# Patient Record
Sex: MALE | Race: WHITE | URBAN - METROPOLITAN AREA
[De-identification: names, ages, dates, MRNs, and addresses within clinical notes are randomized per-mention and may not be internally consistent; named-entity substitution may affect disease eponyms.]

---

## 2017-08-05 ENCOUNTER — APPOINTMENT (OUTPATIENT)
Dept: CT IMAGING | Facility: CLINIC | Age: 67
End: 2017-08-05
Attending: EMERGENCY MEDICINE
Payer: COMMERCIAL

## 2017-08-05 ENCOUNTER — HOSPITAL ENCOUNTER (EMERGENCY)
Facility: CLINIC | Age: 67
Discharge: HOME | End: 2017-08-05
Attending: EMERGENCY MEDICINE | Admitting: EMERGENCY MEDICINE
Payer: COMMERCIAL

## 2017-08-05 VITALS
DIASTOLIC BLOOD PRESSURE: 81 MMHG | TEMPERATURE: 97.5 F | RESPIRATION RATE: 18 BRPM | SYSTOLIC BLOOD PRESSURE: 131 MMHG | OXYGEN SATURATION: 98 % | HEART RATE: 75 BPM | WEIGHT: 253 LBS

## 2017-08-05 DIAGNOSIS — R10.9 LEFT FLANK PAIN: ICD-10-CM

## 2017-08-05 LAB
ALBUMIN UR-MCNC: NEGATIVE MG/DL
ANION GAP SERPL CALCULATED.3IONS-SCNC: 6 MMOL/L (ref 3–14)
APPEARANCE UR: ABNORMAL
BACTERIA #/AREA URNS HPF: ABNORMAL /HPF
BASOPHILS # BLD AUTO: 0.1 10E9/L (ref 0–0.2)
BASOPHILS NFR BLD AUTO: 0.9 %
BILIRUB UR QL STRIP: NEGATIVE
BUN SERPL-MCNC: 20 MG/DL (ref 7–30)
CALCIUM SERPL-MCNC: 9.2 MG/DL (ref 8.5–10.1)
CHLORIDE SERPL-SCNC: 107 MMOL/L (ref 94–109)
CO2 SERPL-SCNC: 28 MMOL/L (ref 20–32)
COLOR UR AUTO: YELLOW
CREAT SERPL-MCNC: 1.15 MG/DL (ref 0.66–1.25)
DIFFERENTIAL METHOD BLD: NORMAL
EOSINOPHIL # BLD AUTO: 0.3 10E9/L (ref 0–0.7)
EOSINOPHIL NFR BLD AUTO: 4.1 %
ERYTHROCYTE [DISTWIDTH] IN BLOOD BY AUTOMATED COUNT: 12.7 % (ref 10–15)
GFR SERPL CREATININE-BSD FRML MDRD: 63 ML/MIN/1.7M2
GLUCOSE SERPL-MCNC: 147 MG/DL (ref 70–99)
GLUCOSE UR STRIP-MCNC: 50 MG/DL
HCT VFR BLD AUTO: 44.1 % (ref 40–53)
HGB BLD-MCNC: 14.8 G/DL (ref 13.3–17.7)
HGB UR QL STRIP: ABNORMAL
IMM GRANULOCYTES # BLD: 0 10E9/L (ref 0–0.4)
IMM GRANULOCYTES NFR BLD: 0.6 %
KETONES UR STRIP-MCNC: NEGATIVE MG/DL
LEUKOCYTE ESTERASE UR QL STRIP: NEGATIVE
LYMPHOCYTES # BLD AUTO: 1.7 10E9/L (ref 0.8–5.3)
LYMPHOCYTES NFR BLD AUTO: 25.1 %
MCH RBC QN AUTO: 30.4 PG (ref 26.5–33)
MCHC RBC AUTO-ENTMCNC: 33.6 G/DL (ref 31.5–36.5)
MCV RBC AUTO: 91 FL (ref 78–100)
MONOCYTES # BLD AUTO: 0.7 10E9/L (ref 0–1.3)
MONOCYTES NFR BLD AUTO: 10.4 %
MUCOUS THREADS #/AREA URNS LPF: PRESENT /LPF
NEUTROPHILS # BLD AUTO: 3.9 10E9/L (ref 1.6–8.3)
NEUTROPHILS NFR BLD AUTO: 58.9 %
NITRATE UR QL: NEGATIVE
NRBC # BLD AUTO: 0 10*3/UL
NRBC BLD AUTO-RTO: 0 /100
PH UR STRIP: 5 PH (ref 5–7)
PLATELET # BLD AUTO: 156 10E9/L (ref 150–450)
POTASSIUM SERPL-SCNC: 4.1 MMOL/L (ref 3.4–5.3)
RBC # BLD AUTO: 4.87 10E12/L (ref 4.4–5.9)
RBC #/AREA URNS AUTO: >182 /HPF (ref 0–2)
SODIUM SERPL-SCNC: 141 MMOL/L (ref 133–144)
SP GR UR STRIP: 1.01 (ref 1–1.03)
URN SPEC COLLECT METH UR: ABNORMAL
UROBILINOGEN UR STRIP-MCNC: 0 MG/DL (ref 0–2)
WBC # BLD AUTO: 6.7 10E9/L (ref 4–11)
WBC #/AREA URNS AUTO: 4 /HPF (ref 0–2)

## 2017-08-05 PROCEDURE — 74176 CT ABD & PELVIS W/O CONTRAST: CPT

## 2017-08-05 PROCEDURE — 96375 TX/PRO/DX INJ NEW DRUG ADDON: CPT

## 2017-08-05 PROCEDURE — 96361 HYDRATE IV INFUSION ADD-ON: CPT

## 2017-08-05 PROCEDURE — 80048 BASIC METABOLIC PNL TOTAL CA: CPT | Performed by: EMERGENCY MEDICINE

## 2017-08-05 PROCEDURE — 85025 COMPLETE CBC W/AUTO DIFF WBC: CPT | Performed by: EMERGENCY MEDICINE

## 2017-08-05 PROCEDURE — 96374 THER/PROPH/DIAG INJ IV PUSH: CPT | Mod: 59

## 2017-08-05 PROCEDURE — 25000128 H RX IP 250 OP 636: Performed by: EMERGENCY MEDICINE

## 2017-08-05 PROCEDURE — 96376 TX/PRO/DX INJ SAME DRUG ADON: CPT

## 2017-08-05 PROCEDURE — 74177 CT ABD & PELVIS W/CONTRAST: CPT

## 2017-08-05 PROCEDURE — 81001 URINALYSIS AUTO W/SCOPE: CPT | Performed by: EMERGENCY MEDICINE

## 2017-08-05 PROCEDURE — 87086 URINE CULTURE/COLONY COUNT: CPT | Performed by: EMERGENCY MEDICINE

## 2017-08-05 PROCEDURE — 99285 EMERGENCY DEPT VISIT HI MDM: CPT | Mod: 25

## 2017-08-05 RX ORDER — HYDROCODONE BITARTRATE AND ACETAMINOPHEN 5; 325 MG/1; MG/1
1-2 TABLET ORAL EVERY 4 HOURS PRN
Qty: 20 TABLET | Refills: 0 | Status: SHIPPED | OUTPATIENT
Start: 2017-08-05

## 2017-08-05 RX ORDER — HYDROMORPHONE HYDROCHLORIDE 1 MG/ML
0.5 INJECTION, SOLUTION INTRAMUSCULAR; INTRAVENOUS; SUBCUTANEOUS ONCE
Status: COMPLETED | OUTPATIENT
Start: 2017-08-05 | End: 2017-08-05

## 2017-08-05 RX ORDER — IOPAMIDOL 755 MG/ML
500 INJECTION, SOLUTION INTRAVASCULAR ONCE
Status: COMPLETED | OUTPATIENT
Start: 2017-08-05 | End: 2017-08-05

## 2017-08-05 RX ORDER — ALFUZOSIN HYDROCHLORIDE 10 MG/1
10 TABLET, EXTENDED RELEASE ORAL DAILY
COMMUNITY

## 2017-08-05 RX ORDER — ONDANSETRON 2 MG/ML
4 INJECTION INTRAMUSCULAR; INTRAVENOUS ONCE
Status: DISCONTINUED | OUTPATIENT
Start: 2017-08-05 | End: 2017-08-05 | Stop reason: HOSPADM

## 2017-08-05 RX ADMIN — SODIUM CHLORIDE 65 ML: 9 INJECTION, SOLUTION INTRAVENOUS at 17:55

## 2017-08-05 RX ADMIN — SODIUM CHLORIDE 1000 ML: 9 INJECTION, SOLUTION INTRAVENOUS at 15:48

## 2017-08-05 RX ADMIN — HYDROMORPHONE HYDROCHLORIDE 1 MG: 1 INJECTION, SOLUTION INTRAMUSCULAR; INTRAVENOUS; SUBCUTANEOUS at 16:47

## 2017-08-05 RX ADMIN — Medication 0.5 MG: at 15:48

## 2017-08-05 RX ADMIN — IOPAMIDOL 100 ML: 755 INJECTION, SOLUTION INTRAVENOUS at 17:55

## 2017-08-05 NOTE — ED AVS SNAPSHOT
[facility] Emergency Department    [address]    [facility] [place] [address]    Phone:  [phone]    Fax:  [phone]                                       Sav Hunt   MRN: [id number]    Department:  [facility] Emergency Department   Date of Visit:  8/5/2017           After Visit Summary Signature Page     I have received my discharge instructions, and my questions have been answered. I have discussed any challenges I see with this plan with the nurse or doctor.    ..........................................................................................................................................  Patient/Patient Representative Signature      ..........................................................................................................................................  Patient Representative Print Name and Relationship to Patient    ..................................................               ................................................  Date                                            Time    ..........................................................................................................................................  Reviewed by Signature/Title    ...................................................              ..............................................  Date                                                            Time

## 2017-08-05 NOTE — ED NOTES
"Pt here with L flank pain and urinary frequency that started yesterday. Pt reports being \"septic\" in June from a kidney stone. Pt has a history of multiple kidney stones  "

## 2017-08-05 NOTE — ED AVS SNAPSHOT
[facility] Emergency Department    [address]    [facility] [address]    Phone:  [phone]    Fax:  [phone]                                       Sav Hunt   MRN: [id number]    Department:  [facility] Emergency Department   Date of Visit:  8/5/2017           Patient Information     Date Of Birth          1950        Your diagnoses for this visit were:     Left flank pain        You were seen by Fabiana Michelle MD.      Follow-up Information     Follow up with [facility] Emergency Department.    Specialty:  EMERGENCY MEDICINE    Why:  If symptoms worsen    Contact information:    [address] [place] [id number] [phone]        Discharge Instructions       Please follow up closely with your regular physician. We recommend that you follow up with a vascular surgeon as well.     Please return to the ED if your symptoms worsen or if you develop new or concerning symptoms.         Flank Pain, Uncertain Cause  The flank is the area between your upper abdomen and your back. Pain there is often caused by a problem with your kidneys. It might be a kidney infection or a kidney stone. Other causes of flank pain include spinal arthritis, a pinched nerve from a back injury, or a back muscle strain or spasm.  The cause of your flank pain is not certain. You may need other tests.  Home care  Follow these tips when caring for yourself at home:    You may use acetaminophen or ibuprofen to control pain, unless your health care provider prescribed another medicine. If you have chronic liver or kidney disease, talk with your provider before taking these medicines. Also talk with your provider first if you ve ever had a stomach ulcer or GI bleeding.    If the pain is coming from your muscles, you may get relief with ice or heat. During the first 2 days after the injury, put an ice pack on the painful area for 20 minutes every 2 to 4  hours. This will reduce swelling and pain. A hot shower, hot bath, or heating pad works well for a muscle spasm. You can start with ice, then switch to heat after 2 days. You might find that alternating ice and heat works well. Use the method that feels the best to you.  Follow-up care  Follow up with your healthcare provider if your symptoms don t get better over the next few days.  When to seek medical advice  Call your healthcare provider right away if any of these happen:    Repeated vomiting    Fever of 100.4 F (38 C) or higher, or as directed by your health care provider    Flank pain that gets worse    Pain that spreads to the front of your belly (abdomen)    Dizziness, weakness, or fainting    Blood in your urine    Burning feeling when you urinate or the need to urinate often    Pain in one of your legs that gets worse    Numbness or weakness in a leg  Date Last Reviewed: 10/1/2016    6777-6540 [employer]. [address] [place] [address]. All rights reserved. This information is not intended as a substitute for professional medical care. Always follow your healthcare professional's instructions.          24 Hour Appointment Hotline       To make an appointment at any [facility], call [phone] ([phone]). If you don't have a family doctor or clinic, we will help you find one. [facility] are conveniently located to serve the needs of you and your family.             Review of your medicines      START taking        Dose / Directions Last dose taken    HYDROcodone-acetaminophen 5-325 MG per tablet   Commonly known as:  NORCO   Dose:  1-2 tablet   Quantity:  20 tablet        Take 1-2 tablets by mouth every 4 hours as needed for moderate to severe pain   Refills:  0          Our records show that you are taking the medicines listed below. If these are incorrect, please call your family doctor or clinic.        Dose / Directions Last dose taken    ACTOS PO         Refills:  0        alfuzosin 10 MG 24 hr tablet   Commonly known as:  UROXATRAL   Dose:  10 mg        Take 10 mg by mouth daily   Refills:  0        ASPIRIN PO        Refills:  0        ATORVASTATIN CALCIUM PO        Refills:  0        ELIQUIS PO        Refills:  0        ENALAPRIL MALEATE PO        Refills:  0        FUROSEMIDE PO        Refills:  0        METFORMIN HCL PO        Refills:  0        METOPROLOL SUCCINATE ER PO        Refills:  0                Prescriptions were sent or printed at these locations (1 Prescription)                   Other Prescriptions                Printed at Department/Unit printer (1 of 1)         HYDROcodone-acetaminophen (NORCO) 5-325 MG per tablet                Procedures and tests performed during your visit     Abd/pelvis CT no contrast - Stone Protocol    Abd/pelvis CT,  IV  contrast only TRAUMA / AAA    Basic metabolic panel (BMP)    CBC + differential    UA with Microscopic reflex to Culture    Urine Culture Aerobic Bacterial      Orders Needing Specimen Collection     None      Pending Results     Date and Time Order Name Status Description    8/5/2017 1723 Abd/pelvis CT,  IV  contrast only TRAUMA / AAA Preliminary     8/5/2017 1553 Urine Culture Aerobic Bacterial Preliminary             Pending Culture Results     Date and Time Order Name Status Description    8/5/2017 1553 Urine Culture Aerobic Bacterial Preliminary             Pending Results Instructions     If you had any lab results that were not finalized at the time of your Discharge, you can call the ED Lab Result RN at [phone]. You will be contacted by this team for any positive Lab results or changes in treatment. The nurses are available 7 days a week from 10A to 6:30P.  You can leave a message 24 hours per day and they will return your call.        Test Results From Your Hospital Stay        8/5/2017  3:42 PM      Component Results     Component Value Ref Range & Units Status    Color Urine Yellow  Final     Appearance Urine Slightly Cloudy  Final    Glucose Urine 50 (A) NEG mg/dL Final    Bilirubin Urine Negative NEG Final    Ketones Urine Negative NEG mg/dL Final    Specific Gravity Urine 1.011 1.003 - 1.035 Final    Blood Urine Large (A) NEG Final    pH Urine 5.0 5.0 - 7.0 pH Final    Protein Albumin Urine Negative NEG mg/dL Final    Urobilinogen mg/dL 0.0 0.0 - 2.0 mg/dL Final    Nitrite Urine Negative NEG Final    Leukocyte Esterase Urine Negative NEG Final    Source Midstream Urine  Final    WBC Urine 4 (H) 0 - 2 /HPF Final    RBC Urine >182 (H) 0 - 2 /HPF Final    Bacteria Urine Few (A) NEG /HPF Final    Mucous Urine Present (A) NEG /LPF Final         8/5/2017  4:03 PM      Component Results     Component Value Ref Range & Units Status    WBC 6.7 4.0 - 11.0 10e9/L Final    RBC Count 4.87 4.4 - 5.9 10e12/L Final    Hemoglobin 14.8 13.3 - 17.7 g/dL Final    Hematocrit 44.1 40.0 - 53.0 % Final    MCV 91 78 - 100 fl Final    MCH 30.4 26.5 - 33.0 pg Final    MCHC 33.6 31.5 - 36.5 g/dL Final    RDW 12.7 10.0 - 15.0 % Final    Platelet Count 156 150 - 450 10e9/L Final    Diff Method Automated Method  Final    % Neutrophils 58.9 % Final    % Lymphocytes 25.1 % Final    % Monocytes 10.4 % Final    % Eosinophils 4.1 % Final    % Basophils 0.9 % Final    % Immature Granulocytes 0.6 % Final    Nucleated RBCs 0 0 /100 Final    Absolute Neutrophil 3.9 1.6 - 8.3 10e9/L Final    Absolute Lymphocytes 1.7 0.8 - 5.3 10e9/L Final    Absolute Monocytes 0.7 0.0 - 1.3 10e9/L Final    Absolute Eosinophils 0.3 0.0 - 0.7 10e9/L Final    Absolute Basophils 0.1 0.0 - 0.2 10e9/L Final    Abs Immature Granulocytes 0.0 0 - 0.4 10e9/L Final    Absolute Nucleated RBC 0.0  Final         8/5/2017  4:15 PM      Component Results     Component Value Ref Range & Units Status    Sodium 141 133 - 144 mmol/L Final    Potassium 4.1 3.4 - 5.3 mmol/L Final    Chloride 107 94 - 109 mmol/L Final    Carbon Dioxide 28 20 - 32 mmol/L Final    Anion Gap 6 3 -  14 mmol/L Final    Glucose 147 (H) 70 - 99 mg/dL Final    Urea Nitrogen 20 7 - 30 mg/dL Final    Creatinine 1.15 0.66 - 1.25 mg/dL Final    GFR Estimate 63 >60 mL/min/1.7m2 Final    Non  GFR Calc    GFR Estimate If Black 76 >60 mL/min/1.7m2 Final    African American GFR Calc    Calcium 9.2 8.5 - 10.1 mg/dL Final         8/5/2017  6:04 PM      Narrative     CT ABDOMEN PELVIS WITHOUT CONTRAST 8/5/2017 4:27 PM     HISTORY: Left flank pain.    FINDINGS: Bilateral renal nonobstructing stones are noted these  measure 0.5 and 1.1 x 1.0 cm on the right. The larger is somewhat  irregular in shape. On the left, these measure 0.8 and 0.6 cm. Left  lower pole 2.5 cm cyst is noted. There appears to be mild right  hydronephrosis and dilatation of the pelvis. However, the right ureter  is normal in caliber raising the possibility of ureteropelvic junction  obstruction versus extrarenal pelvis. No left ureteral dilatation or  stone is demonstrated. The liver and spleen are unremarkable for the  unenhanced technique. There is no pericolonic inflammatory stranding.  No evidence of bowel obstruction. No free peritoneal fluid or air.  Aortic and bilateral iliac artery extensive calcification is noted  with small saccular aneurysms involving the aorta and iliac arteries.  Pelvic contents are otherwise unremarkable.        Impression     IMPRESSION:  1. Bilateral renal nonobstructing stones.  2. Right extrarenal pelvis versus mild to moderate hydronephrosis  resulting from ureteropelvic junction obstruction.  3. Extensive aortic and iliac artery calcifications and multiple small  saccular aneurysms.  4. No unenhanced CT evidence of an acute inflammatory process in the  abdomen or pelvis.     KALYAN ESTEBAN MD         8/5/2017  6:00 PM      Component Results     Component    Specimen Description    Midstream Urine    Special Requests    Specimen received in preservative    Culture Micro    Pending    Micro Report Status     Pending         8/5/2017  6:16 PM      Narrative     CT ABDOMEN PELVIS WITH CONTRAST 8/5/2017 6:03 PM     HISTORY: Aneurysmal dilatation on non-contrast CT, evaluate for  dissection.    CONTRAST DOSE:  100 mL Isovue-[id number].    Radiation dose for this scan was reduced using automated exposure  control, adjustment of the mA and/or kV according to patient size, or  iterative reconstruction technique.    FINDINGS:  Again, there is mild aneurysmal dilatation of the aorta  which measures up to 3.6 x 3.0 cm transversely in the infrarenal  segment. Multiple renal cysts are noted. Right extrarenal pelvis  versus ureteropelvic junction obstruction is again noted. However,  renal cortical enhancement is symmetrical and not delayed on the  right. The liver, spleen, adrenal glands, pancreas, and gallbladder  appear within normal limits. No evidence of bowel obstruction or free  peritoneal fluid/air. Pelvic contents appear within normal limits.        Impression     IMPRESSION: Infrarenal abdominal aortic aneurysm measuring up to 3.6 x  3.0 cm. Saccular aneurysm is also noted in the left common iliac  artery. No evidence of aortic dissection. Right extrarenal pelvis  versus ureteropelvic junction obstruction. Given symmetrical cortical  enhancement, renal obstruction is thought less likely. No CT evidence  of an acute inflammatory process in the abdomen or pelvis.                 Clinical Quality Measure: Blood Pressure Screening     Your blood pressure was checked while you were in the emergency department today. The last reading we obtained was  BP: 131/81 . Please read the guidelines below about what these numbers mean and what you should do about them.  If your systolic blood pressure (the top number) is less than 120 and your diastolic blood pressure (the bottom number) is less than 80, then your blood pressure is normal. There is nothing more that you need to do about it.  If your systolic blood pressure (the top number) is  "120-139 or your diastolic blood pressure (the bottom number) is 80-89, your blood pressure may be higher than it should be. You should have your blood pressure rechecked within a year by a primary care provider.  If your systolic blood pressure (the top number) is 140 or greater or your diastolic blood pressure (the bottom number) is 90 or greater, you may have high blood pressure. High blood pressure is treatable, but if left untreated over time it can put you at risk for heart attack, stroke, or kidney failure. You should have your blood pressure rechecked by a primary care provider within the next 4 weeks.  If your provider in the emergency department today gave you specific instructions to follow-up with your doctor or provider even sooner than that, you should follow that instruction and not wait for up to 4 weeks for your follow-up visit.        Thank you for choosing [facility]       Thank you for choosing [facility] for your care. Our goal is always to provide you with excellent care. Hearing back from our patients is one way we can continue to improve our services. Please take a few minutes to complete the written survey that you may receive in the mail after you visit with us. Thank you!        [employer] Information     [employer] lets you send messages to your doctor, view your test results, renew your prescriptions, schedule appointments and more. To sign up, go to [email or web address] . Click on \"Log in\" on the left side of the screen, which will take you to the Welcome page. Then click on \"Sign up Now\" on the right side of the page.     You will be asked to enter the access code listed below, as well as some personal information. Please follow the directions to create your username and password.     Your access code is: [email or web address]  Expires: 11/3/2017  6:23 PM     Your access code will  in 90 days. If you need help or a new code, please call your [facility] clinic or [phone].        Care EveryWhere " ID     This is your Care EveryWhere ID. This could be used by other organizations to access your [facility] medical records  [id number]        Equal Access to Services     ALEC QUIÑONEZ : Tona Villar, lizz peres, dallin garcia, yoli anderson. So [facility] [phone].    ATENCIÓN: Si habla español, tiene a botello disposición servicios gratuitos de asistencia lingüística. Llame al [phone].    We comply with applicable federal civil rights laws and [place] laws. We do not discriminate on the basis of race, color, national origin, age, disability sex, sexual orientation or gender identity.            After Visit Summary       This is your record. Keep this with you and show to your community pharmacist(s) and doctor(s) at your next visit.

## 2017-08-05 NOTE — DISCHARGE INSTRUCTIONS
Please follow up closely with your regular physician. We recommend that you follow up with a vascular surgeon as well.     Please return to the ED if your symptoms worsen or if you develop new or concerning symptoms.         Flank Pain, Uncertain Cause  The flank is the area between your upper abdomen and your back. Pain there is often caused by a problem with your kidneys. It might be a kidney infection or a kidney stone. Other causes of flank pain include spinal arthritis, a pinched nerve from a back injury, or a back muscle strain or spasm.  The cause of your flank pain is not certain. You may need other tests.  Home care  Follow these tips when caring for yourself at home:    You may use acetaminophen or ibuprofen to control pain, unless your health care provider prescribed another medicine. If you have chronic liver or kidney disease, talk with your provider before taking these medicines. Also talk with your provider first if you ve ever had a stomach ulcer or GI bleeding.    If the pain is coming from your muscles, you may get relief with ice or heat. During the first 2 days after the injury, put an ice pack on the painful area for 20 minutes every 2 to 4 hours. This will reduce swelling and pain. A hot shower, hot bath, or heating pad works well for a muscle spasm. You can start with ice, then switch to heat after 2 days. You might find that alternating ice and heat works well. Use the method that feels the best to you.  Follow-up care  Follow up with your healthcare provider if your symptoms don t get better over the next few days.  When to seek medical advice  Call your healthcare provider right away if any of these happen:    Repeated vomiting    Fever of 100.4 F (38 C) or higher, or as directed by your health care provider    Flank pain that gets worse    Pain that spreads to the front of your belly (abdomen)    Dizziness, weakness, or fainting    Blood in your urine    Burning feeling when you urinate or  the need to urinate often    Pain in one of your legs that gets worse    Numbness or weakness in a leg  Date Last Reviewed: 10/1/2016    8266-4882 [employer]. [address] [place] [address], [place] [address]. All rights reserved. This information is not intended as a substitute for professional medical care. Always follow your healthcare professional's instructions.

## 2017-08-06 LAB
BACTERIA SPEC CULT: NO GROWTH
Lab: NORMAL
MICRO REPORT STATUS: NORMAL
SPECIMEN SOURCE: NORMAL

## 2017-08-06 ASSESSMENT — ENCOUNTER SYMPTOMS
DYSURIA: 0
CONSTIPATION: 0
CHILLS: 0
RESPIRATORY NEGATIVE: 1
NAUSEA: 0
HEMATURIA: 1
CARDIOVASCULAR NEGATIVE: 1
NEUROLOGICAL NEGATIVE: 1
ABDOMINAL PAIN: 1
FLANK PAIN: 1
VOMITING: 0
DIARRHEA: 0
FEVER: 0

## 2017-08-06 NOTE — ED PROVIDER NOTES
History     Chief Complaint:  Flank Pain      HPI   Sav Hunt is a 67 year old male who presents with flank pain. The patient states that two days ago he developed mild pain in the L flank region that radiates into the L abdomen. He notes that the pain has been constant since that time, but worsened today, thus he decided to present to the ED. Associated symptoms include hematuria. He notes that he has had multiple kidney stones in the past and these symptoms are similar. He denies fever, chills, nausea, vomiting, dysuria, changes in stools, or other complaints at this time. Of note, the patient is from [place].     Allergies:  Allergies   Allergen Reactions     Terramycin [Oxytetracycline]         Medications:      alfuzosin (UROXATRAL) 10 MG 24 hr tablet   ENALAPRIL MALEATE PO   ATORVASTATIN CALCIUM PO   Pioglitazone HCl (ACTOS PO)   Apixaban (ELIQUIS PO)   METFORMIN HCL PO   FUROSEMIDE PO   METOPROLOL SUCCINATE ER PO   ASPIRIN PO   HYDROcodone-acetaminophen (NORCO) 5-325 MG per tablet       Problem List:    There are no active problems to display for this patient.       Past Medical History:    Past Medical History:   Diagnosis Date     Atrial fibrillation (H)      Chronic kidney disease      Diabetes (H)      Hypertension        Past Surgical History:    Past Surgical History:   Procedure Laterality Date     APPENDECTOMY       CARDIAC SURGERY      ablation     COLONOSCOPY       EYE SURGERY      cataract     GENITOURINARY SURGERY       HERNIA REPAIR       ORTHOPEDIC SURGERY         Family History:    No family history on file.    Social History:  Marital Status:   [2]  Social History   Substance Use Topics     Smoking status: Former Smoker     Smokeless tobacco: Never Used     Alcohol use Yes      Comment: 2-3 times a week        Review of Systems   Constitutional: Negative for chills and fever.   HENT: Negative.    Respiratory: Negative.    Cardiovascular: Negative.    Gastrointestinal: Positive  for abdominal pain. Negative for constipation, diarrhea, nausea and vomiting.   Genitourinary: Positive for flank pain and hematuria. Negative for dysuria.   Neurological: Negative.        Physical Exam   First Vitals:  BP: 146/76  Pulse: 75  Temp: 97.5  F (36.4  C)  Resp: 18  Weight: 114.8 kg (253 lb)  SpO2: 97 %    Physical Exam  Constitutional: The patient is oriented to person, place, and time. Alert and cooperative.  HENT:   Right Ear: External ear normal.   Left Ear: External ear normal.   Nose: Nose normal.   Mouth/Throat: Uvula is midline, oropharynx is clear and moist and mucous membranes are normal. No posterior oropharyngeal edema or erythema.   Eyes: Conjunctivae, EOM and lids are normal. Pupils are equal, round, and reactive to light.   Neck: Trachea normal. Normal range of motion. Neck supple.   Cardiovascular: Normal rate, regular rhythm, normal heart sounds, and intact distal pulses.    Pulmonary/Chest: Effort normal and breath sounds equal bilaterally. No crackles or wheezing.   Abdominal: Soft. No significant tenderness to palpation. No CVA tenderness.  No rebound and no guarding.   Musculoskeletal: Normal range of motion.  No extremity tenderness or edema.  Neurological: Alert and Oriented. Strength 5/5 in upper and lower extremities bilaterally. Sensation intact to light touch throughout.  Skin: Skin is dry. No rash noted.          Emergency Department Course     Results for orders placed or performed during the hospital encounter of 08/05/17 (from the past 24 hour(s))   UA with Microscopic reflex to Culture   Result Value Ref Range    Color Urine Yellow     Appearance Urine Slightly Cloudy     Glucose Urine 50 (A) NEG mg/dL    Bilirubin Urine Negative NEG    Ketones Urine Negative NEG mg/dL    Specific Gravity Urine 1.011 1.003 - 1.035    Blood Urine Large (A) NEG    pH Urine 5.0 5.0 - 7.0 pH    Protein Albumin Urine Negative NEG mg/dL    Urobilinogen mg/dL 0.0 0.0 - 2.0 mg/dL    Nitrite Urine  Negative NEG    Leukocyte Esterase Urine Negative NEG    Source Midstream Urine     WBC Urine 4 (H) 0 - 2 /HPF    RBC Urine >182 (H) 0 - 2 /HPF    Bacteria Urine Few (A) NEG /HPF    Mucous Urine Present (A) NEG /LPF   Urine Culture Aerobic Bacterial   Result Value Ref Range    Specimen Description Midstream Urine     Special Requests Specimen received in preservative     Culture Micro Pending     Micro Report Status Pending    CBC + differential   Result Value Ref Range    WBC 6.7 4.0 - 11.0 10e9/L    RBC Count 4.87 4.4 - 5.9 10e12/L    Hemoglobin 14.8 13.3 - 17.7 g/dL    Hematocrit 44.1 40.0 - 53.0 %    MCV 91 78 - 100 fl    MCH 30.4 26.5 - 33.0 pg    MCHC 33.6 31.5 - 36.5 g/dL    RDW 12.7 10.0 - 15.0 %    Platelet Count 156 150 - 450 10e9/L    Diff Method Automated Method     % Neutrophils 58.9 %    % Lymphocytes 25.1 %    % Monocytes 10.4 %    % Eosinophils 4.1 %    % Basophils 0.9 %    % Immature Granulocytes 0.6 %    Nucleated RBCs 0 0 /100    Absolute Neutrophil 3.9 1.6 - 8.3 10e9/L    Absolute Lymphocytes 1.7 0.8 - 5.3 10e9/L    Absolute Monocytes 0.7 0.0 - 1.3 10e9/L    Absolute Eosinophils 0.3 0.0 - 0.7 10e9/L    Absolute Basophils 0.1 0.0 - 0.2 10e9/L    Abs Immature Granulocytes 0.0 0 - 0.4 10e9/L    Absolute Nucleated RBC 0.0    Basic metabolic panel (BMP)   Result Value Ref Range    Sodium 141 133 - 144 mmol/L    Potassium 4.1 3.4 - 5.3 mmol/L    Chloride 107 94 - 109 mmol/L    Carbon Dioxide 28 20 - 32 mmol/L    Anion Gap 6 3 - 14 mmol/L    Glucose 147 (H) 70 - 99 mg/dL    Urea Nitrogen 20 7 - 30 mg/dL    Creatinine 1.15 0.66 - 1.25 mg/dL    GFR Estimate 63 >60 mL/min/1.7m2    GFR Estimate If Black 76 >60 mL/min/1.7m2    Calcium 9.2 8.5 - 10.1 mg/dL   Abd/pelvis CT no contrast - Stone Protocol    Narrative    CT ABDOMEN PELVIS WITHOUT CONTRAST 8/5/2017 4:27 PM     HISTORY: Left flank pain.    FINDINGS: Bilateral renal nonobstructing stones are noted these  measure 0.5 and 1.1 x 1.0 cm on the right. The  larger is somewhat  irregular in shape. On the left, these measure 0.8 and 0.6 cm. Left  lower pole 2.5 cm cyst is noted. There appears to be mild right  hydronephrosis and dilatation of the pelvis. However, the right ureter  is normal in caliber raising the possibility of ureteropelvic junction  obstruction versus extrarenal pelvis. No left ureteral dilatation or  stone is demonstrated. The liver and spleen are unremarkable for the  unenhanced technique. There is no pericolonic inflammatory stranding.  No evidence of bowel obstruction. No free peritoneal fluid or air.  Aortic and bilateral iliac artery extensive calcification is noted  with small saccular aneurysms involving the aorta and iliac arteries.  Pelvic contents are otherwise unremarkable.      Impression    IMPRESSION:  1. Bilateral renal nonobstructing stones.  2. Right extrarenal pelvis versus mild to moderate hydronephrosis  resulting from ureteropelvic junction obstruction.  3. Extensive aortic and iliac artery calcifications and multiple small  saccular aneurysms.  4. No unenhanced CT evidence of an acute inflammatory process in the  abdomen or pelvis.     KALYAN ESTEBAN MD   Abd/pelvis CT,  IV  contrast only TRAUMA / AAA    Narrative    CT ABDOMEN PELVIS WITH CONTRAST 8/5/2017 6:03 PM     HISTORY: Aneurysmal dilatation on non-contrast CT, evaluate for  dissection.    CONTRAST DOSE:  100 mL [id number].    Radiation dose for this scan was reduced using automated exposure  control, adjustment of the mA and/or kV according to patient size, or  iterative reconstruction technique.    FINDINGS:  Again, there is mild aneurysmal dilatation of the aorta  which measures up to 3.6 x 3.0 cm transversely in the infrarenal  segment. Multiple renal cysts are noted. Right extrarenal pelvis  versus ureteropelvic junction obstruction is again noted. However,  renal cortical enhancement is symmetrical and not delayed on the  right. The liver, spleen, adrenal glands,  pancreas, and gallbladder  appear within normal limits. No evidence of bowel obstruction or free  peritoneal fluid/air. Pelvic contents appear within normal limits.      Impression    IMPRESSION: Infrarenal abdominal aortic aneurysm measuring up to 3.6 x  3.0 cm. Saccular aneurysm is also noted in the left common iliac  artery. No evidence of aortic dissection. Right extrarenal pelvis  versus ureteropelvic junction obstruction. Given symmetrical cortical  enhancement, renal obstruction is thought less likely. No CT evidence  of an acute inflammatory process in the abdomen or pelvis.     KALYAN ESTEBAN MD       Impression & Plan      Medical Decision Making:  Sav Hunt is a 67 year old male who presents with flank pain. Upon presentation in the ED, the patient is non-toxic appearing. He is mildly tachycardic, but vitals are otherwise within normal limits and stable. On exam, he is well appearing. He is alert, oriented, and neuro exam is non-focal. Cardiopulmonary exam is unremarkable. He has no significant abdominal tenderness to palpation. He has no significant CVA tenderness. The rest of his exam is as mentioned above.     Labs were obtained and are as mentioned above. Notably, he does not have a leukocytosis. Urinalysis is not concerning for an infectious process. CT stone protocol was obtained and demonstrates No evidence of L sided ureteral stone or hydronephrosis. There is no acute inflammatory process in the abdomen or pelvis. It does demonstrate extensive aortic and iliac artery calcifications and multiple small saccular aneurysms. Per review of records obtained through care everywhere, the patient has had this aneurysm and saccular aneurysms noted on previous CT's. These results were discussed with the patient and he notes understanding. Given that the patient does endorse left flank pain, without a clear etiology from his evaluation thus far, and he does have evidence of an aortic aneurysm, I do feel  that repeat CT of the abdomen with contrast to evaluate for dissection is indicated at this time. I discussed this thoroughly with the patient and he is in agreement to obtain the CT. CT abdomen with contrast was then obtained and does demonstrate an infrarenal abdominal aortic aneurysm measuring up to 3.6 x 3.0 cm. There is a saccular aneurysm also noted in the left common iliac artery. There is no evidence of aortic dissection. These results were discussed with the patient and he notes understanding. The etiology of the patient's flank pain is unclear at this time. There is no acute process identified on his lab evaluation or CT. There is no evidence of an underlying infectious process. Given that the patient is well appearing, I do feel that he can be discharged to home. I did discuss with the patient that I recommend close follow up with a vascular surgeon. The patient is from [place], thus he states that he will follow closely with his primary care physician to obtain this referral. Return instructions were given. He was stable/improved at the time of discharge.     Diagnosis:    ICD-10-CM    1. Left flank pain R10.9        Disposition:  discharged to home    Discharge Medications:  Discharge Medication List as of 8/5/2017  6:24 PM      START taking these medications    Details   HYDROcodone-acetaminophen (NORCO) 5-325 MG per tablet Take 1-2 tablets by mouth every 4 hours as needed for moderate to severe pain, Disp-20 tablet, R-0, Local Print               Fabiana Michelle  8/5/2017   [facility] EMERGENCY DEPARTMENT       Fabiana Michelle MD  08/06/17 0119

## 2022-09-29 ENCOUNTER — HOSPITAL ENCOUNTER (OUTPATIENT)
Dept: GENERAL RADIOLOGY | Age: 72
Discharge: HOME | End: 2022-09-29
Attending: PODIATRIST

## 2022-09-29 ENCOUNTER — LAB SERVICES (OUTPATIENT)
Dept: LAB | Age: 72
End: 2022-09-29

## 2022-09-29 DIAGNOSIS — R20.2 NUMBNESS AND TINGLING OF BOTH FEET: ICD-10-CM

## 2022-09-29 DIAGNOSIS — R53.83 FATIGUE, UNSPECIFIED TYPE: ICD-10-CM

## 2022-09-29 DIAGNOSIS — R20.0 NUMBNESS AND TINGLING OF BOTH FEET: ICD-10-CM

## 2022-09-29 DIAGNOSIS — M54.50 LOW BACK PAIN, UNSPECIFIED BACK PAIN LATERALITY, UNSPECIFIED CHRONICITY, UNSPECIFIED WHETHER SCIATICA PRESENT: ICD-10-CM

## 2022-09-29 LAB — 25(OH)D3+25(OH)D2 SERPL-MCNC: 36.7 NG/ML (ref 30–100)

## 2022-09-29 PROCEDURE — 82306 VITAMIN D 25 HYDROXY: CPT | Performed by: CLINICAL MEDICAL LABORATORY

## 2022-09-29 PROCEDURE — 36415 COLL VENOUS BLD VENIPUNCTURE: CPT | Performed by: INTERNAL MEDICINE

## 2022-09-29 PROCEDURE — 72110 X-RAY EXAM L-2 SPINE 4/>VWS: CPT | Performed by: RADIOLOGY

## 2022-09-29 PROCEDURE — 72110 X-RAY EXAM L-2 SPINE 4/>VWS: CPT

## 2022-10-06 PROBLEM — L60.8 INCURVATED NAIL: Status: ACTIVE | Noted: 2022-10-06

## 2022-10-06 PROBLEM — L60.0 INGROWN NAIL: Status: ACTIVE | Noted: 2022-10-06

## 2022-10-06 PROBLEM — R20.2 NUMBNESS AND TINGLING OF BOTH FEET: Status: ACTIVE | Noted: 2022-10-06

## 2022-10-06 PROBLEM — M54.50 LOW BACK PAIN: Status: ACTIVE | Noted: 2022-10-06

## 2022-10-06 PROBLEM — B35.1 ONYCHOMYCOSIS: Status: ACTIVE | Noted: 2022-10-06

## 2022-10-06 PROBLEM — R20.0 NUMBNESS AND TINGLING OF BOTH FEET: Status: ACTIVE | Noted: 2022-10-06

## 2022-10-06 PROBLEM — E11.42 DM TYPE 2 WITH DIABETIC PERIPHERAL NEUROPATHY (CMD): Status: ACTIVE | Noted: 2022-10-06

## 2022-10-06 PROBLEM — M79.674 PAIN IN TOES OF BOTH FEET: Status: ACTIVE | Noted: 2022-10-06

## 2022-10-06 PROBLEM — M79.675 PAIN IN TOES OF BOTH FEET: Status: ACTIVE | Noted: 2022-10-06

## 2022-10-06 PROBLEM — L60.3 DYSTROPHIC NAIL: Status: ACTIVE | Noted: 2022-10-06

## 2022-10-06 PROBLEM — R53.83 FATIGUE: Status: ACTIVE | Noted: 2022-10-06

## 2022-10-06 PROBLEM — M79.676 PAIN AROUND TOENAIL: Status: ACTIVE | Noted: 2022-10-06

## 2022-10-06 PROBLEM — R09.89 DIMINISHED PULSES IN LOWER EXTREMITY: Status: ACTIVE | Noted: 2022-10-06

## 2022-10-06 PROBLEM — M85.872 OSTEOPENIA OF BOTH FEET: Status: ACTIVE | Noted: 2022-10-06

## 2022-10-06 PROBLEM — M85.871 OSTEOPENIA OF BOTH FEET: Status: ACTIVE | Noted: 2022-10-06

## 2022-10-06 PROBLEM — E11.8 DIABETIC FOOT (CMD): Status: ACTIVE | Noted: 2022-10-06

## 2022-10-20 PROBLEM — L02.612 CELLULITIS AND ABSCESS OF TOE OF LEFT FOOT: Status: ACTIVE | Noted: 2022-10-20

## 2022-10-20 PROBLEM — L03.032 CELLULITIS AND ABSCESS OF TOE OF LEFT FOOT: Status: ACTIVE | Noted: 2022-10-20

## 2022-10-20 PROBLEM — B35.9: Status: ACTIVE | Noted: 2022-10-20

## 2022-10-20 PROBLEM — A49.02 MRSA (METHICILLIN RESISTANT STAPHYLOCOCCUS AUREUS): Status: ACTIVE | Noted: 2022-10-20

## 2022-10-20 PROBLEM — A49.8 PSEUDOMONAS AERUGINOSA INFECTION: Status: ACTIVE | Noted: 2022-10-20

## 2022-10-20 PROBLEM — G89.29 CHRONIC RIGHT-SIDED LOW BACK PAIN WITHOUT SCIATICA: Status: ACTIVE | Noted: 2022-10-06

## 2022-11-08 PROBLEM — M79.605 PAIN IN BOTH LOWER EXTREMITIES: Status: ACTIVE | Noted: 2022-11-08

## 2022-11-08 PROBLEM — M79.604 PAIN IN BOTH LOWER EXTREMITIES: Status: ACTIVE | Noted: 2022-11-08
